# Patient Record
Sex: FEMALE | Race: WHITE
[De-identification: names, ages, dates, MRNs, and addresses within clinical notes are randomized per-mention and may not be internally consistent; named-entity substitution may affect disease eponyms.]

---

## 2019-07-13 ENCOUNTER — HOSPITAL ENCOUNTER (OUTPATIENT)
Dept: HOSPITAL 95 - LAB EV | Age: 84
Discharge: HOME | End: 2019-07-13
Attending: GENERAL PRACTICE
Payer: COMMERCIAL

## 2019-07-13 DIAGNOSIS — R35.0: ICD-10-CM

## 2019-07-13 DIAGNOSIS — R53.81: Primary | ICD-10-CM

## 2019-07-13 LAB
ALBUMIN SERPL BCP-MCNC: 3.8 G/DL (ref 3.4–5)
ALBUMIN/GLOB SERPL: 1 {RATIO} (ref 0.8–1.8)
ALT SERPL W P-5'-P-CCNC: 21 U/L (ref 12–78)
ANION GAP SERPL CALCULATED.4IONS-SCNC: 8 MMOL/L (ref 6–16)
AST SERPL W P-5'-P-CCNC: 30 U/L (ref 12–37)
BASOPHILS # BLD AUTO: 0.07 K/MM3 (ref 0–0.23)
BASOPHILS NFR BLD AUTO: 1 % (ref 0–2)
BILIRUB SERPL-MCNC: 0.5 MG/DL (ref 0.1–1)
BUN SERPL-MCNC: 10 MG/DL (ref 8–24)
CALCIUM SERPL-MCNC: 9 MG/DL (ref 8.5–10.1)
CHLORIDE SERPL-SCNC: 103 MMOL/L (ref 98–108)
CO2 SERPL-SCNC: 30 MMOL/L (ref 21–32)
CREAT SERPL-MCNC: 0.69 MG/DL (ref 0.4–1)
DEPRECATED RDW RBC AUTO: 44 FL (ref 35.1–46.3)
EOSINOPHIL # BLD AUTO: 0.08 K/MM3 (ref 0–0.68)
EOSINOPHIL NFR BLD AUTO: 2 % (ref 0–6)
ERYTHROCYTE [DISTWIDTH] IN BLOOD BY AUTOMATED COUNT: 12.7 % (ref 11.7–14.2)
GLOBULIN SER CALC-MCNC: 3.7 G/DL (ref 2.2–4)
GLUCOSE SERPL-MCNC: 92 MG/DL (ref 70–99)
HCT VFR BLD AUTO: 40 % (ref 33–51)
HGB BLD-MCNC: 13.2 G/DL (ref 11.5–16)
IMM GRANULOCYTES # BLD AUTO: 0.01 K/MM3 (ref 0–0.1)
IMM GRANULOCYTES NFR BLD AUTO: 0 % (ref 0–1)
LYMPHOCYTES # BLD AUTO: 0.72 K/MM3 (ref 0.84–5.2)
LYMPHOCYTES NFR BLD AUTO: 14 % (ref 21–46)
MCHC RBC AUTO-ENTMCNC: 33 G/DL (ref 31.5–36.5)
MCV RBC AUTO: 95 FL (ref 80–100)
MONOCYTES # BLD AUTO: 0.46 K/MM3 (ref 0.16–1.47)
MONOCYTES NFR BLD AUTO: 9 % (ref 4–13)
NEUTROPHILS # BLD AUTO: 3.72 K/MM3 (ref 1.96–9.15)
NEUTROPHILS NFR BLD AUTO: 74 % (ref 41–73)
NRBC # BLD AUTO: 0 K/MM3 (ref 0–0.02)
NRBC BLD AUTO-RTO: 0 /100 WBC (ref 0–0.2)
PLATELET # BLD AUTO: 305 K/MM3 (ref 150–400)
POTASSIUM SERPL-SCNC: 4.2 MMOL/L (ref 3.5–5.5)
PROT SERPL-MCNC: 7.5 G/DL (ref 6.4–8.2)
SODIUM SERPL-SCNC: 141 MMOL/L (ref 136–145)
TSH SERPL DL<=0.005 MIU/L-ACNC: 2.44 UIU/ML (ref 0.36–4.8)

## 2019-07-15 ENCOUNTER — HOSPITAL ENCOUNTER (EMERGENCY)
Dept: HOSPITAL 95 - ER | Age: 84
LOS: 1 days | Discharge: HOME | End: 2019-07-16
Payer: COMMERCIAL

## 2019-07-15 VITALS — BODY MASS INDEX: 17.07 KG/M2 | WEIGHT: 100 LBS | HEIGHT: 64 IN

## 2019-07-15 DIAGNOSIS — Z79.899: ICD-10-CM

## 2019-07-15 DIAGNOSIS — Z88.8: ICD-10-CM

## 2019-07-15 DIAGNOSIS — Z91.041: ICD-10-CM

## 2019-07-15 DIAGNOSIS — I10: Primary | ICD-10-CM

## 2019-07-15 DIAGNOSIS — Z88.5: ICD-10-CM

## 2019-07-15 LAB
ALBUMIN SERPL BCP-MCNC: 4.1 G/DL (ref 3.4–5)
ALBUMIN/GLOB SERPL: 1.1 {RATIO} (ref 0.8–1.8)
ALT SERPL W P-5'-P-CCNC: 20 U/L (ref 12–78)
ANION GAP SERPL CALCULATED.4IONS-SCNC: 7 MMOL/L (ref 6–16)
AST SERPL W P-5'-P-CCNC: 24 U/L (ref 12–37)
BASOPHILS # BLD AUTO: 0.07 K/MM3 (ref 0–0.23)
BASOPHILS NFR BLD AUTO: 1 % (ref 0–2)
BILIRUB SERPL-MCNC: 0.6 MG/DL (ref 0.1–1)
BUN SERPL-MCNC: 10 MG/DL (ref 8–24)
CALCIUM SERPL-MCNC: 9.4 MG/DL (ref 8.5–10.1)
CHLORIDE SERPL-SCNC: 104 MMOL/L (ref 98–108)
CO2 SERPL-SCNC: 28 MMOL/L (ref 21–32)
CREAT SERPL-MCNC: 0.65 MG/DL (ref 0.4–1)
DEPRECATED RDW RBC AUTO: 45.5 FL (ref 35.1–46.3)
EOSINOPHIL # BLD AUTO: 0.09 K/MM3 (ref 0–0.68)
EOSINOPHIL NFR BLD AUTO: 1 % (ref 0–6)
ERYTHROCYTE [DISTWIDTH] IN BLOOD BY AUTOMATED COUNT: 12.7 % (ref 11.7–14.2)
GLOBULIN SER CALC-MCNC: 3.7 G/DL (ref 2.2–4)
GLUCOSE SERPL-MCNC: 89 MG/DL (ref 70–99)
HCT VFR BLD AUTO: 42.5 % (ref 33–51)
HGB BLD-MCNC: 13.5 G/DL (ref 11.5–16)
IMM GRANULOCYTES # BLD AUTO: 0.02 K/MM3 (ref 0–0.1)
IMM GRANULOCYTES NFR BLD AUTO: 0 % (ref 0–1)
LYMPHOCYTES # BLD AUTO: 0.6 K/MM3 (ref 0.84–5.2)
LYMPHOCYTES NFR BLD AUTO: 8 % (ref 21–46)
MCHC RBC AUTO-ENTMCNC: 31.8 G/DL (ref 31.5–36.5)
MCV RBC AUTO: 97 FL (ref 80–100)
MONOCYTES # BLD AUTO: 0.53 K/MM3 (ref 0.16–1.47)
MONOCYTES NFR BLD AUTO: 7 % (ref 4–13)
NEUTROPHILS # BLD AUTO: 5.91 K/MM3 (ref 1.96–9.15)
NEUTROPHILS NFR BLD AUTO: 82 % (ref 41–73)
NRBC # BLD AUTO: 0 K/MM3 (ref 0–0.02)
NRBC BLD AUTO-RTO: 0 /100 WBC (ref 0–0.2)
PLATELET # BLD AUTO: 308 K/MM3 (ref 150–400)
POTASSIUM SERPL-SCNC: 3.7 MMOL/L (ref 3.5–5.5)
PROT SERPL-MCNC: 7.8 G/DL (ref 6.4–8.2)
SODIUM SERPL-SCNC: 139 MMOL/L (ref 136–145)
TROPONIN I SERPL-MCNC: <0.015 NG/ML (ref 0–0.04)

## 2019-08-19 ENCOUNTER — HOSPITAL ENCOUNTER (OUTPATIENT)
Dept: HOSPITAL 95 - LAB SHORT | Age: 84
Discharge: HOME | End: 2019-08-19
Attending: PHYSICIAN ASSISTANT
Payer: COMMERCIAL

## 2019-08-19 DIAGNOSIS — R42: ICD-10-CM

## 2019-08-19 DIAGNOSIS — N39.0: Primary | ICD-10-CM

## 2019-08-19 LAB
BASOPHILS # BLD AUTO: 0.06 K/MM3 (ref 0–0.23)
BASOPHILS NFR BLD AUTO: 1 % (ref 0–2)
DEPRECATED RDW RBC AUTO: 41.6 FL (ref 35.1–46.3)
EOSINOPHIL # BLD AUTO: 0.09 K/MM3 (ref 0–0.68)
EOSINOPHIL NFR BLD AUTO: 2 % (ref 0–6)
ERYTHROCYTE [DISTWIDTH] IN BLOOD BY AUTOMATED COUNT: 12 % (ref 11.7–14.2)
HCT VFR BLD AUTO: 37.3 % (ref 33–51)
HGB BLD-MCNC: 12.3 G/DL (ref 11.5–16)
IMM GRANULOCYTES # BLD AUTO: 0.02 K/MM3 (ref 0–0.1)
IMM GRANULOCYTES NFR BLD AUTO: 0 % (ref 0–1)
LYMPHOCYTES # BLD AUTO: 0.55 K/MM3 (ref 0.84–5.2)
LYMPHOCYTES NFR BLD AUTO: 11 % (ref 21–46)
MCHC RBC AUTO-ENTMCNC: 33 G/DL (ref 31.5–36.5)
MCV RBC AUTO: 94 FL (ref 80–100)
MONOCYTES # BLD AUTO: 0.53 K/MM3 (ref 0.16–1.47)
MONOCYTES NFR BLD AUTO: 11 % (ref 4–13)
NEUTROPHILS # BLD AUTO: 3.8 K/MM3 (ref 1.96–9.15)
NEUTROPHILS NFR BLD AUTO: 75 % (ref 41–73)
NRBC # BLD AUTO: 0 K/MM3 (ref 0–0.02)
NRBC BLD AUTO-RTO: 0 /100 WBC (ref 0–0.2)
PLATELET # BLD AUTO: 232 K/MM3 (ref 150–400)

## 2019-08-20 ENCOUNTER — HOSPITAL ENCOUNTER (EMERGENCY)
Dept: HOSPITAL 95 - ER | Age: 84
LOS: 1 days | Discharge: HOME | End: 2019-08-21
Payer: COMMERCIAL

## 2019-08-20 VITALS — WEIGHT: 80.01 LBS | BODY MASS INDEX: 15.71 KG/M2 | HEIGHT: 60 IN

## 2019-08-20 DIAGNOSIS — R42: Primary | ICD-10-CM

## 2019-08-20 DIAGNOSIS — Z88.8: ICD-10-CM
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
## 2019-08-20 DIAGNOSIS — Z88.5: ICD-10-CM

## 2019-08-20 LAB
BASOPHILS # BLD AUTO: 0.05 K/MM3 (ref 0–0.23)
BASOPHILS NFR BLD AUTO: 1 % (ref 0–2)
DEPRECATED RDW RBC AUTO: 41.5 FL (ref 35.1–46.3)
EOSINOPHIL # BLD AUTO: 0.04 K/MM3 (ref 0–0.68)
EOSINOPHIL NFR BLD AUTO: 1 % (ref 0–6)
ERYTHROCYTE [DISTWIDTH] IN BLOOD BY AUTOMATED COUNT: 11.9 % (ref 11.7–14.2)
HCT VFR BLD AUTO: 39.6 % (ref 33–51)
HGB BLD-MCNC: 12.8 G/DL (ref 11.5–16)
IMM GRANULOCYTES # BLD AUTO: 0.03 K/MM3 (ref 0–0.1)
IMM GRANULOCYTES NFR BLD AUTO: 0 % (ref 0–1)
KETONES UR STRIP-MCNC: (no result) MG/DL
LYMPHOCYTES # BLD AUTO: 0.47 K/MM3 (ref 0.84–5.2)
LYMPHOCYTES NFR BLD AUTO: 6 % (ref 21–46)
MCHC RBC AUTO-ENTMCNC: 32.3 G/DL (ref 31.5–36.5)
MCV RBC AUTO: 95 FL (ref 80–100)
MONOCYTES # BLD AUTO: 0.68 K/MM3 (ref 0.16–1.47)
MONOCYTES NFR BLD AUTO: 9 % (ref 4–13)
NEUTROPHILS # BLD AUTO: 6.08 K/MM3 (ref 1.96–9.15)
NEUTROPHILS NFR BLD AUTO: 83 % (ref 41–73)
NRBC # BLD AUTO: 0 K/MM3 (ref 0–0.02)
NRBC BLD AUTO-RTO: 0 /100 WBC (ref 0–0.2)
PLATELET # BLD AUTO: 250 K/MM3 (ref 150–400)
RBC #/AREA URNS HPF: (no result) /HPF (ref 0–2)
SP GR SPEC: 1.01 (ref 1–1.02)
UROBILINOGEN UR STRIP-MCNC: (no result) MG/DL
WBC #/AREA URNS HPF: (no result) /HPF (ref 0–5)

## 2019-08-21 LAB
ALBUMIN SERPL BCP-MCNC: 3.6 G/DL (ref 3.4–5)
ALBUMIN/GLOB SERPL: 0.9 {RATIO} (ref 0.8–1.8)
ALT SERPL W P-5'-P-CCNC: 15 U/L (ref 12–78)
ANION GAP SERPL CALCULATED.4IONS-SCNC: 10 MMOL/L (ref 6–16)
AST SERPL W P-5'-P-CCNC: 20 U/L (ref 12–37)
BILIRUB SERPL-MCNC: 0.5 MG/DL (ref 0.1–1)
BUN SERPL-MCNC: 8 MG/DL (ref 8–24)
CALCIUM SERPL-MCNC: 9 MG/DL (ref 8.5–10.1)
CHLORIDE SERPL-SCNC: 105 MMOL/L (ref 98–108)
CO2 SERPL-SCNC: 25 MMOL/L (ref 21–32)
CREAT SERPL-MCNC: 0.55 MG/DL (ref 0.4–1)
GLOBULIN SER CALC-MCNC: 4.1 G/DL (ref 2.2–4)
GLUCOSE SERPL-MCNC: 99 MG/DL (ref 70–99)
POTASSIUM SERPL-SCNC: 3.7 MMOL/L (ref 3.5–5.5)
PROT SERPL-MCNC: 7.7 G/DL (ref 6.4–8.2)
SODIUM SERPL-SCNC: 140 MMOL/L (ref 136–145)
TROPONIN I SERPL-MCNC: <0.015 NG/ML (ref 0–0.04)

## 2019-09-19 ENCOUNTER — HOSPITAL ENCOUNTER (OUTPATIENT)
Dept: HOSPITAL 95 - LAB SHORT | Age: 84
Discharge: HOME | End: 2019-09-19
Attending: NURSE PRACTITIONER
Payer: COMMERCIAL

## 2019-09-19 DIAGNOSIS — R31.9: Primary | ICD-10-CM

## 2019-10-07 ENCOUNTER — HOSPITAL ENCOUNTER (INPATIENT)
Dept: HOSPITAL 95 - ER | Age: 84
LOS: 4 days | Discharge: NURSING, REHAB OR LONG-TERM CARE | DRG: 481 | End: 2019-10-11
Attending: HOSPITALIST | Admitting: HOSPITALIST
Payer: COMMERCIAL

## 2019-10-07 VITALS — HEIGHT: 64.02 IN | BODY MASS INDEX: 17.73 KG/M2 | WEIGHT: 103.84 LBS

## 2019-10-07 DIAGNOSIS — M17.0: ICD-10-CM
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
## 2019-10-07 DIAGNOSIS — S32.059A: ICD-10-CM

## 2019-10-07 DIAGNOSIS — K64.4: ICD-10-CM

## 2019-10-07 DIAGNOSIS — I10: ICD-10-CM

## 2019-10-07 DIAGNOSIS — Z66: ICD-10-CM

## 2019-10-07 DIAGNOSIS — R13.10: ICD-10-CM

## 2019-10-07 DIAGNOSIS — M81.0: ICD-10-CM

## 2019-10-07 DIAGNOSIS — Z86.12: ICD-10-CM

## 2019-10-07 DIAGNOSIS — S72.112A: Primary | ICD-10-CM

## 2019-10-07 DIAGNOSIS — R29.6: ICD-10-CM

## 2019-10-07 DIAGNOSIS — R47.81: ICD-10-CM

## 2019-10-07 PROCEDURE — C1769 GUIDE WIRE: HCPCS

## 2019-10-07 PROCEDURE — C1713 ANCHOR/SCREW BN/BN,TIS/BN: HCPCS

## 2019-10-08 LAB
ANION GAP SERPL CALCULATED.4IONS-SCNC: 7 MMOL/L
BASOPHILS # BLD AUTO: 0.05 K/MM3
BASOPHILS NFR BLD AUTO: 1 %
BUN SERPL-MCNC: 15 MG/DL
CALCIUM SERPL-MCNC: 8.5 MG/DL
CHLORIDE SERPL-SCNC: 108 MMOL/L
CO2 SERPL-SCNC: 26 MMOL/L
CREAT SERPL-MCNC: 0.61 MG/DL
DEPRECATED RDW RBC AUTO: 42.5 FL
EOSINOPHIL # BLD AUTO: 0.02 K/MM3
EOSINOPHIL NFR BLD AUTO: 0 %
ERYTHROCYTE [DISTWIDTH] IN BLOOD BY AUTOMATED COUNT: 12.3 %
GLUCOSE SERPL-MCNC: 100 MG/DL
HCT VFR BLD AUTO: 37.3 %
HGB BLD-MCNC: 11.8 G/DL
IMM GRANULOCYTES # BLD AUTO: 0.02 K/MM3
IMM GRANULOCYTES NFR BLD AUTO: 0 %
LYMPHOCYTES # BLD AUTO: 0.65 K/MM3
LYMPHOCYTES NFR BLD AUTO: 8 %
MCHC RBC AUTO-ENTMCNC: 31.6 G/DL
MCV RBC AUTO: 93 FL
MONOCYTES # BLD AUTO: 0.8 K/MM3
MONOCYTES NFR BLD AUTO: 10 %
NEUTROPHILS # BLD AUTO: 6.7 K/MM3
NEUTROPHILS NFR BLD AUTO: 81 %
NRBC # BLD AUTO: 0 K/MM3
NRBC BLD AUTO-RTO: 0 /100 WBC
PLATELET # BLD AUTO: 195 K/MM3
POTASSIUM SERPL-SCNC: 3.6 MMOL/L
SODIUM SERPL-SCNC: 141 MMOL/L

## 2019-10-08 NOTE — NUR
PT ADMITTED FROM ED FOR L FEMUR FX. PT WILL POSSIBLY NEED AN ORTHO CONSULT PER
ER NOTE. PT A&O X4. BP AND HR ELEVATED. GIVEN HYDRALAZINE PER EMAR. PT
MEDICATED WITH FENTANYL IN ER AND REPORTS PAIN IS TOLERABLE AT THIS TIME. WILL
CTM. PT ORIENTED TO ROOM AND EDUCATED ON CALL LIGHT. FLUIDS INFUSING PER EMAR.

## 2019-10-08 NOTE — NUR
SHIFT SUMMARY
PT HAS DONE WELL THIS SHIFT. DR IRENE IN TO SEE PATIENT, MRI DONE. PLAN TO
MAKE PT NPO AT MIDNIGHT AND POSSIBLY TAKE TO OR TOMORROW DEPENDING ON MRI
RESULTS. IVF RUNNING PER EMAR. MEDICATED FOR PAIN TWICE PER EMAR.

## 2019-10-08 NOTE — NUR
NEW ONSET OF TACHYCARDIA WITH HR RANGING FROM 135-140'S AT REST. SINUS TACH
PER TELEMETRY TECH. PT DENYING PAIN. PT ASYMPTOMATIC. ALL OTHER VS STABLE.
HOSPITALIST NOTIFIED. NEW ORDER FOR ONE TIME DOSE OF METOPROLOL.

## 2019-10-09 PROCEDURE — 0QS704Z REPOSITION LEFT UPPER FEMUR WITH INTERNAL FIXATION DEVICE, OPEN APPROACH: ICD-10-PCS | Performed by: ORTHOPAEDIC SURGERY

## 2019-10-09 NOTE — NUR
SHIFT SUMMARY/BACK FROM OR
PT ARRIVED BACK FROM OR VIA BED. PT ARRIVED ON RA, PLACED ON 3L VIA NASAL
CANULA R/T SATS 88-90. CURRENTLY AT 98 ON 3L. PT DENIES PAIN AND NAUSEA.
CURRENTLY SLEEPING IN BED. PT ABLE TO ANSWER QUESTIONS AND FOLLOW DIRECTIONS
BUT VERY SLEEPY. BANDAGES IN PLACE BULKY DRESSINGS SMALL AMOUNT OF DISCHARGE
SEROUS DRAINAGE. OTHER THAN THAT CDI. IV FLUIDS INFUSING AT 50ML/HR. BED ALARM
ON CALL LIGHT IN REACH. PT HAS MAEGAN HOSE AND PBCS ON.

## 2019-10-09 NOTE — NUR
PATIENT WAS COMPLAINED OF PAIN IN HER LT LEG. SHE HAS BEEN NPO SINCE MIDNIGHT
FOR PROBABLE SURGERY TODAY. SHE IS ON BEDREST AND HELPS TO REPOSITION SELF IN
BED. AT THE BEGINING FOT THE SHIFT BOTH IV SITES WERE EITHER PULLING OUT OF
INFLITRATED. NEW 18 GUAGE WAS STARTED.

## 2019-10-09 NOTE — NUR
PT ALERT, ORIENTED TO NAME, PLACE AND YEAR. AGREES WITH PLANNNED SURGERY.
REPORT TO MONICA AGGARWAL.

## 2019-10-10 LAB
KETONES UR STRIP-MCNC: (no result) MG/DL
LEUKOCYTE ESTERASE UR QL STRIP: (no result)
PROT UR STRIP-MCNC: (no result) MG/DL
RBC #/AREA URNS HPF: (no result) /HPF
SP GR SPEC: 1.02
UROBILINOGEN UR STRIP-MCNC: (no result) MG/DL
WBC #/AREA URNS HPF: (no result) /HPF

## 2019-10-10 NOTE — NUR
CHANGE IN LOC
PT CALLED FOR ASSISTANCE, WHEN STAFF ARRIVED IN THE ROOM PT APPEARED TO BE
RESTING WITH HER EYES CLOSED.  PT WAS AWAKENED,  UPON AWAKENING PT DID NOT
RESPOND TO QUESTIONS OR DIRECTIONS.  PT'S EYES WERE GAZING TO THE RIGHT AND
SHE WAS UNABLE TRACK WITH HER EYES.  PT SLOWLY BECAME MORE RESPONSIVE, WHEN
ASKED TO LIFT HER ARMS SHE COULD ONLY LIFT HER RIGHT ARM.  TARAN RN AND ROBBIN
RN NOTIFIED OF CHANGES.  AFTER SEVERAL MINUTES PT WAS ALBE TO SPEAK AND MOVE
UPPER AND LOWER EXTREMITIES BILATERALLY.  BP STABLE 102/42,  PT DID REPORT
DIZZINESS.  DR. DEL VALLE NOTIFIED OF EPISODE, WILL CONTINUE IV FLUIDS AND CONTINUE
TO MONITOR PT AND NOTIFY DR. DEL VALLE IF PT HAS FURTHER SYMPTOMS.  TELE MONITOR
NOTIFIED, PER TERRY DAUGHERTY, NO CHANGES REPORTED IN HEART RHYTHM.  PT REMAINS
DROWSY BUT RESPONDS APPROPRIATELY.  WILL CONTINUE TO MONITOR.

## 2019-10-10 NOTE — NUR
SHIFT SUMMARY
POD 1 LEFT HIP FIXATION
PATIENT HAS BEEN INTERMITTINTLY ALERT AND ORIENTED. AT TIMES DURING SHIFT
PATIENT WOULD STOP USING LEFT SIDE AND SPEECH WOULD BECOME GARBLED. THE
PATIENT WOULD RECOVER AND BEGIN COMMUNICATING IN COMPLETE SENTENCES AGAIN. DR. MCFARLAND ORDERED A SERIES OF TESTS AND LABS, INCREASED FLUIDS BEGAN AND STILL
AWAITNG TRANSFER TO MRI. PATIENT HAS DENIED PAIN DURING THE DAY WITH SOME
COMPLAINTS OF DIZZINESS. PATIENT HAD A SPEECH EVAL AS WAS COUGHING AND UNABLE
TO EAT ON OWN. DIET ORDERS CHANGED. PATIENT CURRENTLY RESTING IN BED. STILL
DENYING PAIN.

## 2019-10-10 NOTE — NUR
SHIFT SUMMARY
 
LYING IN SEMI FOWLERS WITH EYES OPEN WITH TV ON IN ROOM.  HAS BEEN CONFUSED
SINCE 5;30 AM.  CONTINUES TO ASK ABOUT MEDS AND BREAKFAST.  DENIES PAIN,
DISCOMFORT, OR FURTHER NEEDS AT THIS TIME.  SAFETY MEASURES IN PLACE.  WILL
GIVE HAND OFF TO ONCOMING SHIFT USING SBAR.

## 2019-10-10 NOTE — NUR
SLURRED SPEECH
PT IS CONTINUING TO HAVE SLURRED SPEECH.  PT WAS ASSISTED WITH DENTURE
ADHESIVE AND DENTURE PLACEMENT, NO IMPROVEMENT IN SPEECH QUALITY.  PT APPEARS
TO HAVE A LEFT SIDED FACIAL DROOP. SHE HAS GENERALIZED WEAKNESS BUT APPEARS TO
BE WEAKEST IN THE L UPPER ARM.  SHE IS SLOW TO RESPOND TO QUESTIONS, REQUESTS
AND INSTRUCTIONS, SHE OFTEN NEEDS THINGS REPEATED.  PT ALSO NOTICED TO HAVE
DIFFICULTY MANAGING FOOD IN HER MOUTH DESPITE SMALL BITES AND CONSTANT
SUPERVISION.  PT CONTINUED TO STRUGGLE WITH MANAGING FOOD EVEN AFTER PT WAS
ASSISTED WITH DENTURE ADHESIVE AND PLACING DENTURES.  PT HAD NO COUGHING WHILE
EATING VOICE CLEAR AFTER SWALLOWING, NO GURGLING NOTICED.  PT SLIGHTLY
CONFUSED REGARDING THE DATE, OTHERWISE ORIENTED.  DR. DEL VALLE NOTIFIED OF
CHANGES.  AWAITING CT.  FOOD REMOVED FROM ROOM, PT WILL REMAIN NPO UNTIL SHE
CAN BE EVALUATED BY SPEECH THERAPY.

## 2019-10-11 LAB
ANION GAP SERPL CALCULATED.4IONS-SCNC: 6 MMOL/L
BASOPHILS # BLD AUTO: 0.03 K/MM3
BASOPHILS NFR BLD AUTO: 1 %
BUN SERPL-MCNC: 15 MG/DL
CALCIUM SERPL-MCNC: 7.4 MG/DL
CHLORIDE SERPL-SCNC: 115 MMOL/L
CO2 SERPL-SCNC: 21 MMOL/L
CREAT SERPL-MCNC: 0.67 MG/DL
DEPRECATED RDW RBC AUTO: 42.5 FL
EOSINOPHIL # BLD AUTO: 0.19 K/MM3
EOSINOPHIL NFR BLD AUTO: 3 %
ERYTHROCYTE [DISTWIDTH] IN BLOOD BY AUTOMATED COUNT: 13.1 %
GLUCOSE SERPL-MCNC: 96 MG/DL
HCT VFR BLD AUTO: 26.4 %
HGB BLD-MCNC: 8.7 G/DL
IMM GRANULOCYTES # BLD AUTO: 0.01 K/MM3
IMM GRANULOCYTES NFR BLD AUTO: 0 %
LYMPHOCYTES # BLD AUTO: 0.99 K/MM3
LYMPHOCYTES NFR BLD AUTO: 16 %
MCHC RBC AUTO-ENTMCNC: 33 G/DL
MCV RBC AUTO: 91 FL
MONOCYTES # BLD AUTO: 0.81 K/MM3
MONOCYTES NFR BLD AUTO: 13 %
NEUTROPHILS # BLD AUTO: 4.13 K/MM3
NEUTROPHILS NFR BLD AUTO: 67 %
NRBC # BLD AUTO: 0 K/MM3
NRBC BLD AUTO-RTO: 0 /100 WBC
PLATELET # BLD AUTO: 161 K/MM3
POTASSIUM SERPL-SCNC: 3.7 MMOL/L
SODIUM SERPL-SCNC: 142 MMOL/L

## 2019-10-11 NOTE — NUR
SUMMARY
PT ABLE TO REPOSITION WITH MINIMAL 1 ASSIST. VOIDING. REPORTS MINIMAL
DISCOMFORT. DSNGS D/I. CICR CKS INTACT. NO NOTED EPISODES OF NEURO CHANGES.
REMAINED ALERT,ORIENTED, APPROPRIATE T/O SHIFT. NO C/O CP OR SOB.